# Patient Record
Sex: FEMALE | Race: BLACK OR AFRICAN AMERICAN
[De-identification: names, ages, dates, MRNs, and addresses within clinical notes are randomized per-mention and may not be internally consistent; named-entity substitution may affect disease eponyms.]

---

## 2020-10-05 ENCOUNTER — HOSPITAL ENCOUNTER (OUTPATIENT)
Dept: HOSPITAL 61 - RAD | Age: 50
Discharge: HOME | End: 2020-10-05
Attending: SURGERY
Payer: COMMERCIAL

## 2020-10-05 DIAGNOSIS — M51.36: Primary | ICD-10-CM

## 2020-10-05 DIAGNOSIS — M25.78: ICD-10-CM

## 2020-10-05 PROCEDURE — 72100 X-RAY EXAM L-S SPINE 2/3 VWS: CPT

## 2020-10-05 NOTE — RAD
EXAM: LUMBAR SPINE 2-3V 10/5/2020 12:00 AM

 

CLINICAL INDICATION:Low back pain, no known injury. Disability 

determination

 

COMPARISON:None

 

TECHNIQUE:AP and lateral views of lumbar spine

 

FINDINGS:There are 5 nonrib-bearing lumbar vertebral bodies. No acute 

fracture. Alignment is normal. There is mild disc space narrowing with 

small anterior osteophytes throughout the lumbar spine. No significant 

facet arthrosis.

 

IMPRESSION:Mild degenerative disc disease. No acute fracture.

 

Electronically signed by: Marge Rasheed MD (10/5/2020 5:11 PM) [id number]